# Patient Record
Sex: FEMALE | Race: WHITE | Employment: PART TIME | URBAN - NONMETROPOLITAN AREA
[De-identification: names, ages, dates, MRNs, and addresses within clinical notes are randomized per-mention and may not be internally consistent; named-entity substitution may affect disease eponyms.]

---

## 2022-11-28 ENCOUNTER — OFFICE VISIT (OUTPATIENT)
Dept: FAMILY MEDICINE CLINIC | Age: 27
End: 2022-11-28
Payer: COMMERCIAL

## 2022-11-28 VITALS
SYSTOLIC BLOOD PRESSURE: 106 MMHG | DIASTOLIC BLOOD PRESSURE: 78 MMHG | OXYGEN SATURATION: 96 % | HEART RATE: 86 BPM | HEIGHT: 65 IN | BODY MASS INDEX: 38.42 KG/M2 | WEIGHT: 230.6 LBS

## 2022-11-28 DIAGNOSIS — Z13.1 DIABETES MELLITUS SCREENING: ICD-10-CM

## 2022-11-28 DIAGNOSIS — K21.9 GASTROESOPHAGEAL REFLUX DISEASE, UNSPECIFIED WHETHER ESOPHAGITIS PRESENT: ICD-10-CM

## 2022-11-28 DIAGNOSIS — J45.20 MILD INTERMITTENT ASTHMA WITHOUT COMPLICATION: ICD-10-CM

## 2022-11-28 DIAGNOSIS — Z13.220 LIPID SCREENING: ICD-10-CM

## 2022-11-28 DIAGNOSIS — E04.9 ENLARGED THYROID: Primary | ICD-10-CM

## 2022-11-28 DIAGNOSIS — R53.82 CHRONIC FATIGUE: ICD-10-CM

## 2022-11-28 PROCEDURE — G8427 DOCREV CUR MEDS BY ELIG CLIN: HCPCS | Performed by: STUDENT IN AN ORGANIZED HEALTH CARE EDUCATION/TRAINING PROGRAM

## 2022-11-28 PROCEDURE — 99204 OFFICE O/P NEW MOD 45 MIN: CPT | Performed by: STUDENT IN AN ORGANIZED HEALTH CARE EDUCATION/TRAINING PROGRAM

## 2022-11-28 PROCEDURE — G8417 CALC BMI ABV UP PARAM F/U: HCPCS | Performed by: STUDENT IN AN ORGANIZED HEALTH CARE EDUCATION/TRAINING PROGRAM

## 2022-11-28 PROCEDURE — G8484 FLU IMMUNIZE NO ADMIN: HCPCS | Performed by: STUDENT IN AN ORGANIZED HEALTH CARE EDUCATION/TRAINING PROGRAM

## 2022-11-28 PROCEDURE — 1036F TOBACCO NON-USER: CPT | Performed by: STUDENT IN AN ORGANIZED HEALTH CARE EDUCATION/TRAINING PROGRAM

## 2022-11-28 RX ORDER — FAMOTIDINE 20 MG/1
20 TABLET, FILM COATED ORAL 2 TIMES DAILY PRN
Qty: 60 TABLET | Refills: 0 | Status: SHIPPED | OUTPATIENT
Start: 2022-11-28

## 2022-11-28 SDOH — ECONOMIC STABILITY: FOOD INSECURITY: WITHIN THE PAST 12 MONTHS, YOU WORRIED THAT YOUR FOOD WOULD RUN OUT BEFORE YOU GOT MONEY TO BUY MORE.: NEVER TRUE

## 2022-11-28 SDOH — ECONOMIC STABILITY: FOOD INSECURITY: WITHIN THE PAST 12 MONTHS, THE FOOD YOU BOUGHT JUST DIDN'T LAST AND YOU DIDN'T HAVE MONEY TO GET MORE.: NEVER TRUE

## 2022-11-28 ASSESSMENT — ENCOUNTER SYMPTOMS
ABDOMINAL PAIN: 0
NAUSEA: 1
VOMITING: 1
SHORTNESS OF BREATH: 0
COUGH: 0
DIARRHEA: 0

## 2022-11-28 ASSESSMENT — PATIENT HEALTH QUESTIONNAIRE - PHQ9
1. LITTLE INTEREST OR PLEASURE IN DOING THINGS: 0
SUM OF ALL RESPONSES TO PHQ QUESTIONS 1-9: 0
SUM OF ALL RESPONSES TO PHQ9 QUESTIONS 1 & 2: 0
SUM OF ALL RESPONSES TO PHQ QUESTIONS 1-9: 0
SUM OF ALL RESPONSES TO PHQ QUESTIONS 1-9: 0
2. FEELING DOWN, DEPRESSED OR HOPELESS: 0
SUM OF ALL RESPONSES TO PHQ QUESTIONS 1-9: 0

## 2022-11-28 ASSESSMENT — SOCIAL DETERMINANTS OF HEALTH (SDOH): HOW HARD IS IT FOR YOU TO PAY FOR THE VERY BASICS LIKE FOOD, HOUSING, MEDICAL CARE, AND HEATING?: NOT HARD AT ALL

## 2022-11-28 NOTE — PROGRESS NOTES
[address] [address]  Dept: [phone]  Dept Fax: [phone]  Loc: [phone]    Parish Solomon is a 32 y.o. female who presents today for her medical conditions/complaints as noted below. Chief Complaint   Patient presents with    Thyroid Problem       HPI:     Patient is here in the office today to establish care. Previous PCP: denies  Specialists: none    Past medical history: enlarged thyroid, asthma    Surgeries:  x 1 (2021), tubal ligation (2021)    Family history:   - Father: anxiety/depression, asthma  - Mother: alive and healthy     Social history:   - Tobacco: denies  - Alcohol: denies  - Marijuana: denies  - Other drugs: denies  - Employment: started at [employer] today  - Household: lives at home with boyfriend and daughter    Preventative care:  Depression screen: due; completed today  HIV screen: declines  Hepatitis C screen: declines  Tdap vaccine: thinks UTD  Pap exam: 10/11/2022; follows with Dr. Yuliana Carmona (OBGYN)  Flu vaccine: declines    Patient would like to discuss enlarged thyroid which she has had for years. States that this was brought up initially by her OB/GYN but she did not follow-up with a primary care provider for evaluation. She denies any associated pain in her neck or thyroid. Denies unexpected weight loss or weight gain, cold or heat intolerance, palpitations. 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 She has tried eating, as well as skipping breakfast, but neither helps. She does have acid reflux for which she sometimes has to take Tums. She also reports that she wakes up in the morning and does not feel well rested. Boyfriend does report that she snores. Admits to chronic fatigue that is more than \"being a mom and working\".     Asthma: Patient admits to history of asthma for which she uses an albuterol inhaler/nebulizer as needed once every 2-3 months. Denies shortness of breath issues. Does feel wheezy on occasion when the weather changes but albuterol does help. Past Medical History:   Diagnosis Date    Asthma       Past Surgical History:   Procedure Laterality Date     SECTION  2021    TUBAL LIGATION  2021       Family History   Problem Relation Age of Onset    No Known Problems Mother     Anxiety Disorder Father     Depression Father     Asthma Father        Social History     Tobacco Use    Smoking status: Never    Smokeless tobacco: Never   Substance Use Topics    Alcohol use: Never      Current Outpatient Medications   Medication Sig Dispense Refill    famotidine (PEPCID) 20 MG tablet Take 1 tablet by mouth 2 times daily as needed (acid reflux) 60 tablet 0     No current facility-administered medications for this visit. Allergies   Allergen Reactions    Lavender Oil        Health Maintenance   Topic Date Due    COVID-19 Vaccine (1) Never done    Varicella vaccine (1 of 2 - 2-dose childhood series) Never done    Pneumococcal 0-64 years Vaccine (1 - PCV) Never done    DTaP/Tdap/Td vaccine (1 - Tdap) Never done    Pap smear  Never done    Flu vaccine (1) 2023 (Originally 2022)    Hepatitis C screen  2023 (Originally 7/10/2013)    HIV screen  2023 (Originally 7/10/2010)    Depression Screen  2023    Hepatitis A vaccine  Aged Out    Hib vaccine  Aged Out    Meningococcal (ACWY) vaccine  Aged Out       Subjective:      Review of Systems   Constitutional:  Positive for fatigue. Negative for chills, fever and unexpected weight change. Respiratory:  Negative for cough and shortness of breath. Cardiovascular:  Negative for chest pain, palpitations and leg swelling. Gastrointestinal:  Positive for nausea (occasional in morning; chronic) and vomiting (occasional in morning; chronic). Negative for abdominal pain and diarrhea. Endocrine: Negative for cold intolerance and heat intolerance. Neurological:  Negative for dizziness and light-headedness. Psychiatric/Behavioral:  Negative for dysphoric mood. Objective:     Physical Exam  Vitals and nursing note reviewed. Constitutional:       General: She is not in acute distress. Appearance: Normal appearance. She is well-developed. She is obese. HENT:      Head: Normocephalic and atraumatic. Right Ear: Tympanic membrane, ear canal and external ear normal.      Left Ear: Tympanic membrane, ear canal and external ear normal.      Nose: Nose normal.      Mouth/Throat:      Lips: Pink. Mouth: Mucous membranes are moist.      Pharynx: Oropharynx is clear. Uvula midline. Eyes:      General: Lids are normal.      Conjunctiva/sclera: Conjunctivae normal.      Pupils: Pupils are equal, round, and reactive to light. Neck:      Thyroid: Thyromegaly (bilaterally enlarged thyroid gland) present. Cardiovascular:      Rate and Rhythm: Normal rate and regular rhythm. Heart sounds: Normal heart sounds. No murmur heard. Pulmonary:      Effort: Pulmonary effort is normal.      Breath sounds: Normal air entry. Wheezing (mild expiratory wheeze in upper lung fields) present. No rhonchi or rales. Musculoskeletal:      Cervical back: Neck supple. Lymphadenopathy:      Cervical: No cervical adenopathy. Skin:     General: Skin is warm and dry. Neurological:      General: No focal deficit present. Mental Status: She is alert and oriented to person, place, and time. Gait: Gait is intact. Psychiatric:         Mood and Affect: Mood and affect normal.         Behavior: Behavior is cooperative.      /78 (Site: Left Upper Arm, Position: Sitting, Cuff Size: Large Adult)   Pulse 86   Ht 5' 5\" (1.651 m)   Wt 230 lb 9.6 oz (104.6 kg)   SpO2 96%   BMI 38.37 kg/m²     Assessment/Plan:   Kamille was seen today for thyroid problem. Diagnoses and all orders for this visit:    Enlarged thyroid  Comments:  Chronic, uncontrolled. Patient has not had prior work-up for this. Plan to check thyroid function labs and thyroid ultrasound at this time. Orders:  -     TSH with Reflex; Future  -     US THYROID; Future    Chronic fatigue  Comments:  Chronic, uncontrolled. Etiology unclear at this time, though consider thyroid dysfunction vs HERMAN vs anemia vs other. Labs ordered as detailed below. Discussed possible sleep study referral with patient but she would like to hold off on this until next visit. Orders:  -     CBC with Auto Differential; Future  -     Comprehensive Metabolic Panel; Future  -     TSH with Reflex; Future    Mild intermittent asthma without complication  Comments:  Chronic, stable but mildly wheezy on exam today. Patient declines maintenance therapy and prefers to use albuterol as needed (using once every 2-3 months). Patient denies frequent wheezing, but states that albuterol does control this. Plan to monitor closely for worsening symptoms. Gastroesophageal reflux disease, unspecified whether esophagitis present  Comments:  Chronic, uncontrolled. This may be contributing to morning nausea and vomiting. Plan to treat with Pepcid twice daily as needed to see if this helps symptoms. Orders:  -     famotidine (PEPCID) 20 MG tablet; Take 1 tablet by mouth 2 times daily as needed (acid reflux)    BMI 38.0-38.9,adult  Comments:  BMI 38.37. Encourage healthy diet changes and exercise as able for weight management. Orders:  -     Hemoglobin A1C; Future  -     Lipid Panel; Future    Lipid screening  Comments:  Fasting lipid panel ordered. Orders:  -     Lipid Panel; Future    Diabetes mellitus screening  Comments:  Hemoglobin A1c ordered. Orders:  -     Hemoglobin A1C; Future    Plan to request records from OB/GYN office for updated Pap results.     Return in about 4 weeks (around 12/26/2022) for follow up on labs/thyroid US.     Electronically signed by Amina Sandoval DO on 11/29/2022 at 7:45 AM

## 2022-12-01 ENCOUNTER — HOSPITAL ENCOUNTER (OUTPATIENT)
Dept: ULTRASOUND IMAGING | Age: 27
Discharge: HOME | End: 2022-12-01
Payer: COMMERCIAL

## 2022-12-01 DIAGNOSIS — E04.9 ENLARGED THYROID: ICD-10-CM

## 2022-12-01 PROCEDURE — 76536 US EXAM OF HEAD AND NECK: CPT

## 2022-12-05 ENCOUNTER — TELEPHONE (OUTPATIENT)
Dept: FAMILY MEDICINE CLINIC | Age: 27
End: 2022-12-05

## 2022-12-05 NOTE — TELEPHONE ENCOUNTER
Spoke to patient aware of results wants to know if there is anything further needs to be done or just monitor?

## 2022-12-05 NOTE — TELEPHONE ENCOUNTER
----- Message from Vishal Carlos, DO sent at 12/2/2022  5:31 PM EST -----  Please let patient know that her thyroid ultrasound showed that she has nodules on both sides of her thyroid that will need repeat ultrasound imaging in 1 year to monitor. Please let me know if she has questions.  Thanks    Dr. Meka Hdz

## 2022-12-05 NOTE — TELEPHONE ENCOUNTER
No, as of right now, we can just continue to monitor. We will plan to repeat her thyroid ultrasound in 1 year.  Thanks    Dr. France Rueda

## 2023-01-03 NOTE — PROGRESS NOTES
[address] [address]  Dept: [phone]  Dept Fax: [phone]  Loc: [phone]    Zully Chang is a 32 y.o. female who presents today for her medical conditions/complaints as noted below. Chief Complaint   Patient presents with    Headache     Constant headache neck pain        HPI:     Patient presents to the office today for a lab review, but has not yet had lab work completed. Thyroid ultrasound showed bilateral subcentimeter thyroid nodules that we will need to repeat thyroid ultrasound monitoring in 1 year. Patient reports that she has had continued nausea with occasional vomiting that has been ongoing since she had her daughter several years ago. Nausea is usually worse in the morning but is not present every morning. She has a history of tubal ligation and denies current pregnancy. She has also noticed some headaches and neck tension over the last 3 weeks. Denies any associated vision changes but does have some photosensitivity. Additionally, she does have nausea with this as well. Ibuprofen does not help much. She has been using heating pad and icy hot which does help temporarily. Has not been doing any neck stretches. Feels that her headache is all coming from her neck. States that she has been taking Pepcid which has helped with heartburn symptoms but has not helped any with the nausea. Nausea is occurring about 3-4 times a week. States that she had to quit her new job because she felt nauseous at work which made her anxious.       Past Medical History:   Diagnosis Date    Asthma       Past Surgical History:   Procedure Laterality Date     SECTION  2021    TUBAL LIGATION  2021       Family History   Problem Relation Age of Onset    No Known Problems Mother     Anxiety Disorder Father     Depression Father     Asthma Father        Social History     Tobacco Use    Smoking status: Never    Smokeless tobacco: Never   Substance Use Topics    Alcohol use: Never      Current Outpatient Medications   Medication Sig Dispense Refill    ondansetron (ZOFRAN) 4 MG tablet Take 1 tablet by mouth every 8 hours as needed for Nausea or Vomiting 30 tablet 0    famotidine (PEPCID) 20 MG tablet Take 1 tablet by mouth 2 times daily as needed (acid reflux) 60 tablet 0     No current facility-administered medications for this visit. Allergies   Allergen Reactions    Lavender Oil        Health Maintenance   Topic Date Due    COVID-19 Vaccine (1) Never done    Varicella vaccine (1 of 2 - 2-dose childhood series) Never done    Pneumococcal 0-64 years Vaccine (1 - PCV) Never done    DTaP/Tdap/Td vaccine (1 - Tdap) Never done    Pap smear  Never done    Flu vaccine (1) 11/29/2023 (Originally 8/1/2022)    Hepatitis C screen  11/29/2023 (Originally 7/10/2013)    HIV screen  11/29/2023 (Originally 7/10/2010)    Depression Screen  01/04/2024    Hepatitis A vaccine  Aged Out    Hib vaccine  Aged Out    Meningococcal (ACWY) vaccine  Aged Out       Subjective:      Review of Systems   Constitutional:  Negative for chills, fever and unexpected weight change. Eyes:  Positive for photophobia (with headaches occasionally). Negative for visual disturbance. Respiratory:  Negative for cough and shortness of breath. Cardiovascular:  Negative for chest pain. Gastrointestinal:  Positive for nausea and vomiting. Negative for abdominal pain.        + heartburn (controlled)   Musculoskeletal:  Positive for neck pain (tension). Negative for back pain and gait problem. Neurological:  Positive for headaches. Negative for dizziness and light-headedness. Objective:     Physical Exam  Vitals and nursing note reviewed. Constitutional:       General: She is not in acute distress. Appearance: Normal appearance. She is morbidly obese. She is not ill-appearing. HENT:      Head: Normocephalic and atraumatic. Right Ear: Tympanic membrane, ear canal and external ear normal.      Left Ear: Tympanic membrane, ear canal and external ear normal.      Mouth/Throat:      Lips: Pink. Mouth: Mucous membranes are moist.      Pharynx: Oropharynx is clear. Uvula midline. Eyes:      General: Lids are normal.      Extraocular Movements: Extraocular movements intact. Conjunctiva/sclera: Conjunctivae normal.      Pupils: Pupils are equal, round, and reactive to light. Cardiovascular:      Rate and Rhythm: Normal rate and regular rhythm. Heart sounds: Normal heart sounds. No murmur heard. Pulmonary:      Effort: Pulmonary effort is normal.      Breath sounds: Normal breath sounds and air entry. No wheezing, rhonchi or rales. Abdominal:      General: There is no distension. Palpations: Abdomen is soft. Tenderness: There is no abdominal tenderness. Musculoskeletal:      Cervical back: Neck supple. Spasms and tenderness (muscle tension and paraspinal muscle discomfort to palpation) present. No deformity or bony tenderness. Thoracic back: Normal. No bony tenderness. Lumbar back: Normal. No bony tenderness. Lymphadenopathy:      Cervical: No cervical adenopathy. Skin:     General: Skin is warm and dry. Neurological:      General: No focal deficit present. Mental Status: She is alert and oriented to person, place, and time. Sensory: Sensation is intact. Gait: Gait is intact. Psychiatric:         Mood and Affect: Mood and affect normal.         Behavior: Behavior is cooperative. /65 (Site: Left Upper Arm, Position: Sitting, Cuff Size: Large Adult)   Pulse 99   Ht 5' 5\" (1.651 m)   Wt 232 lb 9.6 oz (105.5 kg)   BMI 38.71 kg/m²     Assessment/Plan:   Kamille was seen today for headache. Diagnoses and all orders for this visit:    Multiple thyroid nodules  Comments:  Multiple bilateral thyroid nodules noted on thyroid ultrasound in 11/2022.   Recommended 1 year follow-up to monitor closely. Thyroid function labs ordered previously but not yet completed --patient advised to complete at earliest convenience. Neck pain  Comments:  Acute x3 weeks, atraumatic, uncontrolled. No red flag symptoms. Appears to be muscular in nature at this time. Advised daily neck stretches, heat, NSAIDs as needed. Can return as needed for osteopathic manipulation as well. Nonintractable episodic headache, unspecified headache type  Comments:  Acute, intermittent x3 weeks, uncontrolled. Appears to be secondary to neck tension as detailed above. Plan to treat neck tension with daily neck stretches, heat, NSAIDs as needed. Patient can return as needed for osteopathic manipulation as well. Nausea  Comments:  Chronic, uncontrolled. Etiology unclear, but lab work was ordered previously to evaluate further. She was advised to complete labs at her earliest convenience. In the meantime, I will prescribe Zofran for patient to take as needed for nausea or vomiting. Pepcid has not provided much relief but has controlled her heartburn symptoms. Orders:  -     ondansetron (ZOFRAN) 4 MG tablet; Take 1 tablet by mouth every 8 hours as needed for Nausea or Vomiting      Return for follow up for manipulation as needed.     Electronically signed by Asher Fowler DO on 1/5/2023 at 7:38 AM

## 2023-01-04 ENCOUNTER — OFFICE VISIT (OUTPATIENT)
Dept: FAMILY MEDICINE CLINIC | Age: 28
End: 2023-01-04
Payer: COMMERCIAL

## 2023-01-04 VITALS
WEIGHT: 232.6 LBS | HEIGHT: 65 IN | BODY MASS INDEX: 38.75 KG/M2 | HEART RATE: 99 BPM | DIASTOLIC BLOOD PRESSURE: 65 MMHG | SYSTOLIC BLOOD PRESSURE: 122 MMHG

## 2023-01-04 DIAGNOSIS — R51.9 NONINTRACTABLE EPISODIC HEADACHE, UNSPECIFIED HEADACHE TYPE: ICD-10-CM

## 2023-01-04 DIAGNOSIS — R11.0 NAUSEA: ICD-10-CM

## 2023-01-04 DIAGNOSIS — E04.2 MULTIPLE THYROID NODULES: Primary | ICD-10-CM

## 2023-01-04 DIAGNOSIS — M54.2 NECK PAIN: ICD-10-CM

## 2023-01-04 PROCEDURE — G8484 FLU IMMUNIZE NO ADMIN: HCPCS | Performed by: STUDENT IN AN ORGANIZED HEALTH CARE EDUCATION/TRAINING PROGRAM

## 2023-01-04 PROCEDURE — G8417 CALC BMI ABV UP PARAM F/U: HCPCS | Performed by: STUDENT IN AN ORGANIZED HEALTH CARE EDUCATION/TRAINING PROGRAM

## 2023-01-04 PROCEDURE — G8427 DOCREV CUR MEDS BY ELIG CLIN: HCPCS | Performed by: STUDENT IN AN ORGANIZED HEALTH CARE EDUCATION/TRAINING PROGRAM

## 2023-01-04 PROCEDURE — 1036F TOBACCO NON-USER: CPT | Performed by: STUDENT IN AN ORGANIZED HEALTH CARE EDUCATION/TRAINING PROGRAM

## 2023-01-04 PROCEDURE — 99214 OFFICE O/P EST MOD 30 MIN: CPT | Performed by: STUDENT IN AN ORGANIZED HEALTH CARE EDUCATION/TRAINING PROGRAM

## 2023-01-04 RX ORDER — ONDANSETRON 4 MG/1
4 TABLET, FILM COATED ORAL EVERY 8 HOURS PRN
Qty: 30 TABLET | Refills: 0 | Status: SHIPPED | OUTPATIENT
Start: 2023-01-04

## 2023-01-04 ASSESSMENT — PATIENT HEALTH QUESTIONNAIRE - PHQ9
2. FEELING DOWN, DEPRESSED OR HOPELESS: 1
SUM OF ALL RESPONSES TO PHQ QUESTIONS 1-9: 2
1. LITTLE INTEREST OR PLEASURE IN DOING THINGS: 1
SUM OF ALL RESPONSES TO PHQ9 QUESTIONS 1 & 2: 2
SUM OF ALL RESPONSES TO PHQ QUESTIONS 1-9: 2

## 2023-01-05 ASSESSMENT — ENCOUNTER SYMPTOMS
SHORTNESS OF BREATH: 0
BACK PAIN: 0
VOMITING: 1
COUGH: 0
NAUSEA: 1
ABDOMINAL PAIN: 0
PHOTOPHOBIA: 1

## 2023-03-16 ENCOUNTER — NURSE ONLY (OUTPATIENT)
Dept: FAMILY MEDICINE CLINIC | Age: 28
End: 2023-03-16

## 2023-03-16 DIAGNOSIS — E04.9 ENLARGED THYROID: ICD-10-CM

## 2023-03-16 DIAGNOSIS — R53.82 CHRONIC FATIGUE: ICD-10-CM

## 2023-03-16 DIAGNOSIS — Z13.220 LIPID SCREENING: ICD-10-CM

## 2023-03-16 DIAGNOSIS — Z13.1 DIABETES MELLITUS SCREENING: ICD-10-CM

## 2023-03-16 LAB
ALBUMIN SERPL BCG-MCNC: 4.3 G/DL (ref 3.5–5.1)
ALP SERPL-CCNC: 90 U/L (ref 38–126)
ALT SERPL W/O P-5'-P-CCNC: 10 U/L (ref 11–66)
ANION GAP SERPL CALC-SCNC: 14 MEQ/L (ref 8–16)
AST SERPL-CCNC: 12 U/L (ref 5–40)
BASOPHILS ABSOLUTE: 0.1 THOU/MM3 (ref 0–0.1)
BASOPHILS NFR BLD AUTO: 1.3 %
BILIRUB SERPL-MCNC: 0.2 MG/DL (ref 0.3–1.2)
BUN SERPL-MCNC: 14 MG/DL (ref 7–22)
CALCIUM SERPL-MCNC: 9.2 MG/DL (ref 8.5–10.5)
CHLORIDE SERPL-SCNC: 106 MEQ/L (ref 98–111)
CHOLEST SERPL-MCNC: 164 MG/DL (ref 100–199)
CO2 SERPL-SCNC: 23 MEQ/L (ref 23–33)
CREAT SERPL-MCNC: 0.6 MG/DL (ref 0.4–1.2)
DEPRECATED MEAN GLUCOSE BLD GHB EST-ACNC: 111 MG/DL (ref 70–126)
DEPRECATED RDW RBC AUTO: 41.5 FL (ref 35–45)
EOSINOPHIL NFR BLD AUTO: 15.4 %
EOSINOPHILS ABSOLUTE: 1 THOU/MM3 (ref 0–0.4)
ERYTHROCYTE [DISTWIDTH] IN BLOOD BY AUTOMATED COUNT: 13.8 % (ref 11.5–14.5)
GFR SERPL CREATININE-BSD FRML MDRD: > 60 ML/MIN/1.73M2
GLUCOSE SERPL-MCNC: 95 MG/DL (ref 70–108)
HBA1C MFR BLD HPLC: 5.7 % (ref 4.4–6.4)
HCT VFR BLD AUTO: 40.2 % (ref 37–47)
HDLC SERPL-MCNC: 46 MG/DL
HGB BLD-MCNC: 14.2 GM/DL (ref 12–16)
IMM GRANULOCYTES # BLD AUTO: 0.01 THOU/MM3 (ref 0–0.07)
IMM GRANULOCYTES NFR BLD AUTO: 0.1 %
LDLC SERPL CALC-MCNC: 95 MG/DL
LYMPHOCYTES ABSOLUTE: 1.7 THOU/MM3 (ref 1–4.8)
LYMPHOCYTES NFR BLD AUTO: 25.3 %
MCH RBC QN AUTO: 29.2 PG (ref 26–33)
MCHC RBC AUTO-ENTMCNC: 35.3 GM/DL (ref 32.2–35.5)
MCV RBC AUTO: 82.7 FL (ref 81–99)
MONOCYTES ABSOLUTE: 0.4 THOU/MM3 (ref 0.4–1.3)
MONOCYTES NFR BLD AUTO: 5.4 %
NEUTROPHILS NFR BLD AUTO: 52.5 %
NRBC BLD AUTO-RTO: 0 /100 WBC
PLATELET # BLD AUTO: 279 THOU/MM3 (ref 130–400)
PMV BLD AUTO: 10 FL (ref 9.4–12.4)
POTASSIUM SERPL-SCNC: 4.3 MEQ/L (ref 3.5–5.2)
PROT SERPL-MCNC: 7.4 G/DL (ref 6.1–8)
RBC # BLD AUTO: 4.86 MILL/MM3 (ref 4.2–5.4)
SEGMENTED NEUTROPHILS ABSOLUTE COUNT: 3.5 THOU/MM3 (ref 1.8–7.7)
SODIUM SERPL-SCNC: 143 MEQ/L (ref 135–145)
TRIGL SERPL-MCNC: 116 MG/DL (ref 0–199)
TSH SERPL DL<=0.005 MIU/L-ACNC: 1.42 UIU/ML (ref 0.4–4.2)
WBC # BLD AUTO: 6.7 THOU/MM3 (ref 4.8–10.8)

## 2023-03-20 ENCOUNTER — TELEPHONE (OUTPATIENT)
Dept: FAMILY MEDICINE CLINIC | Age: 28
End: 2023-03-20

## 2023-03-20 NOTE — TELEPHONE ENCOUNTER
----- Message from Evangelista Jones DO sent at 3/16/2023  9:06 PM EDT -----  Please let patient know that labs showed normal blood counts, thyroid function, liver and kidney function. Cholesterol looks good overall. Hemoglobin A1c (test for diabetes) is 5.7% which means she is pre-diabetic. I would recommend that she works on healthy diet changes and exercise as able which will help improve this. Let me know if she has questions.  Thanks    Dr Chris Mercer

## 2023-09-01 ENCOUNTER — OFFICE VISIT (OUTPATIENT)
Dept: FAMILY MEDICINE CLINIC | Age: 28
End: 2023-09-01
Payer: COMMERCIAL

## 2023-09-01 VITALS
HEIGHT: 65 IN | WEIGHT: 216 LBS | DIASTOLIC BLOOD PRESSURE: 84 MMHG | HEART RATE: 86 BPM | RESPIRATION RATE: 18 BRPM | TEMPERATURE: 98.2 F | SYSTOLIC BLOOD PRESSURE: 138 MMHG | BODY MASS INDEX: 35.99 KG/M2 | OXYGEN SATURATION: 98 %

## 2023-09-01 DIAGNOSIS — K52.9 ACUTE GASTROENTERITIS: Primary | ICD-10-CM

## 2023-09-01 DIAGNOSIS — R11.0 NAUSEA: ICD-10-CM

## 2023-09-01 PROCEDURE — 1036F TOBACCO NON-USER: CPT | Performed by: STUDENT IN AN ORGANIZED HEALTH CARE EDUCATION/TRAINING PROGRAM

## 2023-09-01 PROCEDURE — G8417 CALC BMI ABV UP PARAM F/U: HCPCS | Performed by: STUDENT IN AN ORGANIZED HEALTH CARE EDUCATION/TRAINING PROGRAM

## 2023-09-01 PROCEDURE — G8427 DOCREV CUR MEDS BY ELIG CLIN: HCPCS | Performed by: STUDENT IN AN ORGANIZED HEALTH CARE EDUCATION/TRAINING PROGRAM

## 2023-09-01 PROCEDURE — 99213 OFFICE O/P EST LOW 20 MIN: CPT | Performed by: STUDENT IN AN ORGANIZED HEALTH CARE EDUCATION/TRAINING PROGRAM

## 2023-09-01 RX ORDER — ONDANSETRON 4 MG/1
4 TABLET, FILM COATED ORAL EVERY 8 HOURS PRN
Qty: 30 TABLET | Refills: 0 | Status: SHIPPED | OUTPATIENT
Start: 2023-09-01

## 2023-09-01 RX ORDER — ONDANSETRON 4 MG/1
4 TABLET, FILM COATED ORAL EVERY 8 HOURS PRN
Qty: 30 TABLET | Refills: 0 | Status: SHIPPED | OUTPATIENT
Start: 2023-09-01 | End: 2023-09-01

## 2023-09-01 RX ORDER — LOPERAMIDE HYDROCHLORIDE 2 MG/1
2 CAPSULE ORAL 4 TIMES DAILY PRN
Qty: 40 CAPSULE | Refills: 0 | Status: SHIPPED | OUTPATIENT
Start: 2023-09-01 | End: 2023-09-01

## 2023-09-01 RX ORDER — LOPERAMIDE HYDROCHLORIDE 2 MG/1
2 CAPSULE ORAL 4 TIMES DAILY PRN
Qty: 40 CAPSULE | Refills: 0 | Status: SHIPPED | OUTPATIENT
Start: 2023-09-01 | End: 2023-09-11

## 2023-09-01 RX ORDER — DICYCLOMINE HYDROCHLORIDE 10 MG/1
10 CAPSULE ORAL 4 TIMES DAILY
Qty: 120 CAPSULE | Refills: 0 | Status: SHIPPED | OUTPATIENT
Start: 2023-09-01 | End: 2023-09-01

## 2023-09-01 RX ORDER — DICYCLOMINE HYDROCHLORIDE 10 MG/1
10 CAPSULE ORAL 4 TIMES DAILY
Qty: 120 CAPSULE | Refills: 0 | Status: SHIPPED | OUTPATIENT
Start: 2023-09-01

## 2023-09-01 SDOH — ECONOMIC STABILITY: FOOD INSECURITY: WITHIN THE PAST 12 MONTHS, THE FOOD YOU BOUGHT JUST DIDN'T LAST AND YOU DIDN'T HAVE MONEY TO GET MORE.: NEVER TRUE

## 2023-09-01 SDOH — ECONOMIC STABILITY: INCOME INSECURITY: HOW HARD IS IT FOR YOU TO PAY FOR THE VERY BASICS LIKE FOOD, HOUSING, MEDICAL CARE, AND HEATING?: NOT HARD AT ALL

## 2023-09-01 SDOH — ECONOMIC STABILITY: HOUSING INSECURITY
IN THE LAST 12 MONTHS, WAS THERE A TIME WHEN YOU DID NOT HAVE A STEADY PLACE TO SLEEP OR SLEPT IN A SHELTER (INCLUDING NOW)?: NO

## 2023-09-01 SDOH — ECONOMIC STABILITY: FOOD INSECURITY: WITHIN THE PAST 12 MONTHS, YOU WORRIED THAT YOUR FOOD WOULD RUN OUT BEFORE YOU GOT MONEY TO BUY MORE.: NEVER TRUE

## 2023-09-01 NOTE — PROGRESS NOTES
[address] [facility]  [address] [place] [id number]  Dept: [phone]  Loc: [phone]    Radha Ibrahim is a 29 y.o. female who presents today for:  Chief Complaint   Patient presents with    Nausea     Starting Sunday, upset stomach, throwing up and diarrhea. Gets major hot flashes when shes about to throw up. Can only keep water down. Tried tums and pepto bismo but doesn't help. Assessment/Plan:     Nila Marks was seen today for nausea. Diagnoses and all orders for this visit:    Acute gastroenteritis  -     dicyclomine (BENTYL) 10 MG capsule; Take 1 capsule by mouth 4 times daily  -     loperamide (RA ANTI-DIARRHEAL) 2 MG capsule; Take 1 capsule by mouth 4 times daily as needed for Diarrhea  -     ondansetron (ZOFRAN) 4 MG tablet; Take 1 tablet by mouth every 8 hours as needed for Nausea or Vomiting    Nausea  -     ondansetron (ZOFRAN) 4 MG tablet; Take 1 tablet by mouth every 8 hours as needed for Nausea or Vomiting      We will treat symptomatically as above with Bentyl, loperamide, Zofran. If symptoms continue can consider stool testing. Return if symptoms worsen or fail to improve.       Medications Prescribed:  Orders Placed This Encounter   Medications    DISCONTD: dicyclomine (BENTYL) 10 MG capsule     Sig: Take 1 capsule by mouth 4 times daily     Dispense:  120 capsule     Refill:  0    DISCONTD: ondansetron (ZOFRAN) 4 MG tablet     Sig: Take 1 tablet by mouth every 8 hours as needed for Nausea or Vomiting     Dispense:  30 tablet     Refill:  0    DISCONTD: loperamide (RA ANTI-DIARRHEAL) 2 MG capsule     Sig: Take 1 capsule by mouth 4 times daily as needed for Diarrhea     Dispense:  40 capsule     Refill:  0    dicyclomine (BENTYL) 10 MG capsule     Sig: Take 1 capsule by mouth 4 times daily     Dispense:  120 capsule     Refill:  0    loperamide (RA ANTI-DIARRHEAL) 2 MG capsule     Sig: Take 1 capsule by mouth 4 times

## 2023-09-05 ENCOUNTER — TELEPHONE (OUTPATIENT)
Dept: FAMILY MEDICINE CLINIC | Age: 28
End: 2023-09-05

## 2023-09-05 ASSESSMENT — ENCOUNTER SYMPTOMS
ABDOMINAL PAIN: 1
COUGH: 0
VOMITING: 1
NAUSEA: 1
SHORTNESS OF BREATH: 0
DIARRHEA: 1
CONSTIPATION: 0

## 2023-09-05 NOTE — TELEPHONE ENCOUNTER
Patient is calling and asking for a work excuse for dates of  8/28 and 8/30,   Patient was seen on 9/1/23 vomiting and diarrhea. States that she was told that if she needed a work note to let us know. Patient would like to  letter at her lunch break around 1150- advised to call before making trip up here.

## 2023-10-09 ENCOUNTER — OFFICE VISIT (OUTPATIENT)
Dept: FAMILY MEDICINE CLINIC | Age: 28
End: 2023-10-09

## 2023-10-09 VITALS
HEIGHT: 65 IN | WEIGHT: 217 LBS | TEMPERATURE: 98.7 F | RESPIRATION RATE: 18 BRPM | OXYGEN SATURATION: 93 % | DIASTOLIC BLOOD PRESSURE: 78 MMHG | BODY MASS INDEX: 36.15 KG/M2 | SYSTOLIC BLOOD PRESSURE: 110 MMHG | HEART RATE: 97 BPM

## 2023-10-09 DIAGNOSIS — J45.21 MILD INTERMITTENT ASTHMA WITH ACUTE EXACERBATION: Primary | ICD-10-CM

## 2023-10-09 PROCEDURE — G8484 FLU IMMUNIZE NO ADMIN: HCPCS | Performed by: STUDENT IN AN ORGANIZED HEALTH CARE EDUCATION/TRAINING PROGRAM

## 2023-10-09 PROCEDURE — 1036F TOBACCO NON-USER: CPT | Performed by: STUDENT IN AN ORGANIZED HEALTH CARE EDUCATION/TRAINING PROGRAM

## 2023-10-09 PROCEDURE — G8427 DOCREV CUR MEDS BY ELIG CLIN: HCPCS | Performed by: STUDENT IN AN ORGANIZED HEALTH CARE EDUCATION/TRAINING PROGRAM

## 2023-10-09 PROCEDURE — G8417 CALC BMI ABV UP PARAM F/U: HCPCS | Performed by: STUDENT IN AN ORGANIZED HEALTH CARE EDUCATION/TRAINING PROGRAM

## 2023-10-09 RX ORDER — PREDNISONE 20 MG/1
40 TABLET ORAL DAILY
Qty: 10 TABLET | Refills: 0 | Status: SHIPPED | OUTPATIENT
Start: 2023-10-09 | End: 2023-10-09

## 2023-10-09 RX ORDER — ALBUTEROL SULFATE 2.5 MG/3ML
2.5 SOLUTION RESPIRATORY (INHALATION) 4 TIMES DAILY PRN
Qty: 120 EACH | Refills: 3 | Status: SHIPPED | OUTPATIENT
Start: 2023-10-09 | End: 2023-10-09

## 2023-10-09 RX ORDER — PREDNISONE 20 MG/1
40 TABLET ORAL DAILY
Qty: 10 TABLET | Refills: 0 | Status: SHIPPED | OUTPATIENT
Start: 2023-10-09 | End: 2023-10-14

## 2023-10-09 RX ORDER — ALBUTEROL SULFATE 2.5 MG/3ML
2.5 SOLUTION RESPIRATORY (INHALATION) 4 TIMES DAILY PRN
Qty: 120 EACH | Refills: 3 | Status: SHIPPED | OUTPATIENT
Start: 2023-10-09

## 2023-10-09 ASSESSMENT — ENCOUNTER SYMPTOMS
SINUS PAIN: 0
DIARRHEA: 0
NAUSEA: 0
COUGH: 1
SHORTNESS OF BREATH: 1
RHINORRHEA: 1
VOMITING: 0
SORE THROAT: 0
CONSTIPATION: 0
SINUS PRESSURE: 0
TROUBLE SWALLOWING: 0
WHEEZING: 1

## 2023-10-09 NOTE — PROGRESS NOTES
[address] [facility]  [address] [facility] [address]  Dept: [phone]  Loc: [phone]    Enedina Tsang is a 29 y.o. female who presents today for:  Chief Complaint   Patient presents with    Cough     Starting saturday Wet cough, very heavy chest. Feels like she's gasping for air. Constant runny nose, lost of appetite. Took COVID test Saturday and today which were negative. Is currently taking amoxicillin for dental reasons. Started Friday for 10 days. Unsure of dosage       Assessment/Plan:     Kamille was seen today for cough. Diagnoses and all orders for this visit:    Mild intermittent asthma with acute exacerbation  -     Discontinue: albuterol (PROVENTIL) (2.5 MG/3ML) 0.083% nebulizer solution; Take 3 mLs by nebulization 4 times daily as needed for Wheezing  -     Discontinue: predniSONE (DELTASONE) 20 MG tablet; Take 2 tablets by mouth daily for 5 days  -     albuterol (PROVENTIL) (2.5 MG/3ML) 0.083% nebulizer solution; Take 3 mLs by nebulization 4 times daily as needed for Wheezing  -     predniSONE (DELTASONE) 20 MG tablet; Take 2 tablets by mouth daily for 5 days      Wheezing on exam significant, will treat as asthma exacerbation as above. Already on amoxicillin, will do 5 days of 40 mg prednisone and refill patient's albuterol nebulizer. Recommended increase hydration, humidifier. Gave ED precautions. No follow-ups on file.       Medications Prescribed:  Orders Placed This Encounter   Medications    DISCONTD: albuterol (PROVENTIL) (2.5 MG/3ML) 0.083% nebulizer solution     Sig: Take 3 mLs by nebulization 4 times daily as needed for Wheezing     Dispense:  120 each     Refill:  3    DISCONTD: predniSONE (DELTASONE) 20 MG tablet     Sig: Take 2 tablets by mouth daily for 5 days     Dispense:  10 tablet     Refill:  0    albuterol (PROVENTIL) (2.5 MG/3ML) 0.083% nebulizer solution     Sig: Take 3 mLs by nebulization 4

## 2024-01-02 ENCOUNTER — OFFICE VISIT (OUTPATIENT)
Dept: FAMILY MEDICINE CLINIC | Age: 29
End: 2024-01-02
Payer: COMMERCIAL

## 2024-01-02 VITALS
OXYGEN SATURATION: 96 % | BODY MASS INDEX: 35.82 KG/M2 | WEIGHT: 215 LBS | RESPIRATION RATE: 17 BRPM | SYSTOLIC BLOOD PRESSURE: 128 MMHG | TEMPERATURE: 98.5 F | DIASTOLIC BLOOD PRESSURE: 82 MMHG | HEIGHT: 65 IN | HEART RATE: 97 BPM

## 2024-01-02 DIAGNOSIS — R05.1 ACUTE COUGH: Primary | ICD-10-CM

## 2024-01-02 LAB
INFLUENZA VIRUS A RNA: NEGATIVE
INFLUENZA VIRUS B RNA: ABNORMAL
Lab: NORMAL
QC PASS/FAIL: NORMAL
SARS-COV-2 RDRP RESP QL NAA+PROBE: NEGATIVE

## 2024-01-02 PROCEDURE — 1036F TOBACCO NON-USER: CPT | Performed by: STUDENT IN AN ORGANIZED HEALTH CARE EDUCATION/TRAINING PROGRAM

## 2024-01-02 PROCEDURE — 99213 OFFICE O/P EST LOW 20 MIN: CPT | Performed by: STUDENT IN AN ORGANIZED HEALTH CARE EDUCATION/TRAINING PROGRAM

## 2024-01-02 PROCEDURE — G8484 FLU IMMUNIZE NO ADMIN: HCPCS | Performed by: STUDENT IN AN ORGANIZED HEALTH CARE EDUCATION/TRAINING PROGRAM

## 2024-01-02 PROCEDURE — G8427 DOCREV CUR MEDS BY ELIG CLIN: HCPCS | Performed by: STUDENT IN AN ORGANIZED HEALTH CARE EDUCATION/TRAINING PROGRAM

## 2024-01-02 PROCEDURE — 87502 INFLUENZA DNA AMP PROBE: CPT | Performed by: STUDENT IN AN ORGANIZED HEALTH CARE EDUCATION/TRAINING PROGRAM

## 2024-01-02 PROCEDURE — 87635 SARS-COV-2 COVID-19 AMP PRB: CPT | Performed by: STUDENT IN AN ORGANIZED HEALTH CARE EDUCATION/TRAINING PROGRAM

## 2024-01-02 PROCEDURE — G8417 CALC BMI ABV UP PARAM F/U: HCPCS | Performed by: STUDENT IN AN ORGANIZED HEALTH CARE EDUCATION/TRAINING PROGRAM

## 2024-01-02 RX ORDER — BENZONATATE 100 MG/1
100-200 CAPSULE ORAL 3 TIMES DAILY PRN
Qty: 60 CAPSULE | Refills: 0 | Status: CANCELLED | OUTPATIENT
Start: 2024-01-02 | End: 2024-01-09

## 2024-01-02 ASSESSMENT — ENCOUNTER SYMPTOMS
VOMITING: 0
SINUS PRESSURE: 0
DIARRHEA: 0
CONSTIPATION: 0
RHINORRHEA: 0
SINUS PAIN: 0
SORE THROAT: 0
NAUSEA: 0
COUGH: 1
SHORTNESS OF BREATH: 0

## 2024-01-02 ASSESSMENT — PATIENT HEALTH QUESTIONNAIRE - PHQ9
2. FEELING DOWN, DEPRESSED OR HOPELESS: 0
SUM OF ALL RESPONSES TO PHQ9 QUESTIONS 1 & 2: 0
1. LITTLE INTEREST OR PLEASURE IN DOING THINGS: 0
SUM OF ALL RESPONSES TO PHQ QUESTIONS 1-9: 0

## 2024-01-02 NOTE — PROGRESS NOTES
[facility]  [facility]  [address] [facility] [address]  [phone]     Kamille Ch is a 28 y.o. female who presents today for:  Chief Complaint   Patient presents with    Congestion     Started 12-26 with Congestion, runny nose, cough, stomach pain, has little appetite, hot/cold flashes. Constant headache with no relief with medication.   Has been using an inhaler but no relief, sudifin.         Assessment/Plan:     Kamille was seen today for congestion.    Diagnoses and all orders for this visit:    Acute cough  -     POCT COVID-19 Rapid, NAAT  -     POCT Influenza A/B DNA (Alere i)      Flu B+, gave the following recommendations    Recommended humidifier at bedside, increased hydration, OTC Mucinex or Robitussin DM for cough expectorant and suppressant. Additionally can try saline nose spray for congestion and Flonase nose spray (2 puffs per nostril at night) to decrease post nasal drip (drainage).    Okay for Tylenol 500mg every 6 hours. Take ibuprofen 800mg every 8 hours as needed. Take both with food and fluids.     In addition, try Pedialyte for hydration.          No follow-ups on file.      Medications Prescribed:  No orders of the defined types were placed in this encounter.      No future appointments.      HPI:     HPI    28-year-old female with past medical history significant for asthma, GERD who presents as a same-day visit for congestion, cough.    Patient states that all of started on 12/26 with congestion, runny nose, cough.  Also had stomach pain and little appetite.  Also having hot and cold flashes but no documented fevers.  States has constant headache with no relief of medication.  Has been using inhaler but no relief.  Also tried Sudafed.    No known sick contacts.   Subjective:      Review of Systems   Constitutional:  Positive for appetite change, chills and diaphoresis. Negative for fatigue and fever.   HENT:  Positive for

## 2024-01-02 NOTE — PATIENT INSTRUCTIONS
Recommended humidifier at bedside, increased hydration, OTC Mucinex or Robitussin DM for cough expectorant and suppressant. Additionally can try saline nose spray for congestion and Flonase nose spray (2 puffs per nostril at night) to decrease post nasal drip (drainage).    Okay for Tylenol 500mg every 6 hours. Take ibuprofen 800mg every 8 hours as needed. Take both with food and fluids.     Try Pedialyte for hydration.     BRAT diet- bananas, rice, applesauce, toast

## 2025-08-27 ENCOUNTER — OFFICE VISIT (OUTPATIENT)
Dept: FAMILY MEDICINE CLINIC | Age: 30
End: 2025-08-27
Payer: COMMERCIAL

## 2025-08-27 VITALS
HEART RATE: 95 BPM | BODY MASS INDEX: 37.87 KG/M2 | WEIGHT: 227.6 LBS | OXYGEN SATURATION: 97 % | DIASTOLIC BLOOD PRESSURE: 82 MMHG | RESPIRATION RATE: 18 BRPM | SYSTOLIC BLOOD PRESSURE: 114 MMHG

## 2025-08-27 DIAGNOSIS — L23.7 POISON IVY DERMATITIS: Primary | ICD-10-CM

## 2025-08-27 PROCEDURE — G8427 DOCREV CUR MEDS BY ELIG CLIN: HCPCS | Performed by: STUDENT IN AN ORGANIZED HEALTH CARE EDUCATION/TRAINING PROGRAM

## 2025-08-27 PROCEDURE — 1036F TOBACCO NON-USER: CPT | Performed by: STUDENT IN AN ORGANIZED HEALTH CARE EDUCATION/TRAINING PROGRAM

## 2025-08-27 PROCEDURE — 99213 OFFICE O/P EST LOW 20 MIN: CPT | Performed by: STUDENT IN AN ORGANIZED HEALTH CARE EDUCATION/TRAINING PROGRAM

## 2025-08-27 PROCEDURE — G8419 CALC BMI OUT NRM PARAM NOF/U: HCPCS | Performed by: STUDENT IN AN ORGANIZED HEALTH CARE EDUCATION/TRAINING PROGRAM

## 2025-08-27 RX ORDER — METHYLPREDNISOLONE ACETATE 40 MG/ML
40 INJECTION, SUSPENSION INTRA-ARTICULAR; INTRALESIONAL; INTRAMUSCULAR; SOFT TISSUE ONCE
Status: COMPLETED | OUTPATIENT
Start: 2025-08-27 | End: 2025-08-27

## 2025-08-27 RX ORDER — HYDROXYZINE HYDROCHLORIDE 10 MG/1
10-20 TABLET, FILM COATED ORAL EVERY 6 HOURS PRN
Qty: 40 TABLET | Refills: 0 | Status: SHIPPED | OUTPATIENT
Start: 2025-08-27

## 2025-08-27 RX ORDER — METHYLPREDNISOLONE 4 MG/1
TABLET ORAL
Qty: 1 KIT | Refills: 0 | Status: SHIPPED | OUTPATIENT
Start: 2025-08-27 | End: 2025-09-02

## 2025-08-27 RX ADMIN — METHYLPREDNISOLONE ACETATE 40 MG: 40 INJECTION, SUSPENSION INTRA-ARTICULAR; INTRALESIONAL; INTRAMUSCULAR; SOFT TISSUE at 14:04

## 2025-08-27 SDOH — ECONOMIC STABILITY: FOOD INSECURITY: WITHIN THE PAST 12 MONTHS, THE FOOD YOU BOUGHT JUST DIDN'T LAST AND YOU DIDN'T HAVE MONEY TO GET MORE.: NEVER TRUE

## 2025-08-27 SDOH — ECONOMIC STABILITY: FOOD INSECURITY: WITHIN THE PAST 12 MONTHS, YOU WORRIED THAT YOUR FOOD WOULD RUN OUT BEFORE YOU GOT MONEY TO BUY MORE.: NEVER TRUE

## 2025-08-27 ASSESSMENT — PATIENT HEALTH QUESTIONNAIRE - PHQ9
SUM OF ALL RESPONSES TO PHQ QUESTIONS 1-9: 0
2. FEELING DOWN, DEPRESSED OR HOPELESS: NOT AT ALL
SUM OF ALL RESPONSES TO PHQ QUESTIONS 1-9: 0
SUM OF ALL RESPONSES TO PHQ QUESTIONS 1-9: 0
1. LITTLE INTEREST OR PLEASURE IN DOING THINGS: NOT AT ALL
SUM OF ALL RESPONSES TO PHQ QUESTIONS 1-9: 0

## 2025-08-28 ASSESSMENT — ENCOUNTER SYMPTOMS
COUGH: 0
NAUSEA: 0
CONSTIPATION: 0
VOMITING: 0
SHORTNESS OF BREATH: 0
DIARRHEA: 0